# Patient Record
Sex: FEMALE | Race: WHITE | NOT HISPANIC OR LATINO | Employment: STUDENT | URBAN - METROPOLITAN AREA
[De-identification: names, ages, dates, MRNs, and addresses within clinical notes are randomized per-mention and may not be internally consistent; named-entity substitution may affect disease eponyms.]

---
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
## 2025-01-07 ENCOUNTER — APPOINTMENT (OUTPATIENT)
Dept: LAB | Facility: HOSPITAL | Age: 20
End: 2025-01-07

## 2025-01-07 DIAGNOSIS — R15.2 FECAL URGENCY: ICD-10-CM

## 2025-01-07 PROCEDURE — 36415 COLL VENOUS BLD VENIPUNCTURE: CPT

## 2025-01-08 ENCOUNTER — APPOINTMENT (OUTPATIENT)
Dept: LAB | Facility: HOSPITAL | Age: 20
End: 2025-01-08

## 2025-01-08 DIAGNOSIS — R15.2 FECAL URGENCY: ICD-10-CM

## 2025-01-08 PROCEDURE — 36415 COLL VENOUS BLD VENIPUNCTURE: CPT

## 2025-01-08 PROCEDURE — 82785 ASSAY OF IGE: CPT

## 2025-01-08 PROCEDURE — 86003 ALLG SPEC IGE CRUDE XTRC EA: CPT

## 2025-01-09 LAB
ALMOND IGE QN: 0.12 KUA/I (ref 0–0.1)
CASHEW NUT IGE QN: <0.1 KUA/I (ref 0–0.1)
CODFISH IGE QN: <0.1 KUA/I (ref 0–0.1)
EGG WHITE IGE QN: <0.1 KUA/I (ref 0–0.1)
GLUTEN IGE QN: <0.1 KUA/I (ref 0–0.1)
HAZELNUT IGE QN: 1.31 KUA/L (ref 0–0.1)
MILK IGE QN: <0.1 KUA/I (ref 0–0.1)
PEANUT IGE QN: <0.1 KUA/I (ref 0–0.1)
SALMON IGE QN: <0.1 KUA/I (ref 0–0.1)
SCALLOP IGE QN: <0.1 KUA/L (ref 0–0.1)
SESAME SEED IGE QN: <0.1 KUA/I (ref 0–0.1)
SHRIMP IGE QN: <0.1 KUA/L (ref 0–0.1)
SOYBEAN IGE QN: <0.1 KUA/I (ref 0–0.1)
TOTAL IGE SMQN RAST: 63.9 KU/L (ref 0–113)
TUNA IGE QN: <0.1 KUA/I (ref 0–0.1)
WALNUT IGE QN: <0.1 KUA/I (ref 0–0.1)
WHEAT IGE QN: <0.1 KUA/I (ref 0–0.1)